# Patient Record
Sex: FEMALE | Race: WHITE | URBAN - METROPOLITAN AREA
[De-identification: names, ages, dates, MRNs, and addresses within clinical notes are randomized per-mention and may not be internally consistent; named-entity substitution may affect disease eponyms.]

---

## 2024-09-06 ENCOUNTER — OFFICE VISIT (OUTPATIENT)
Age: 59
End: 2024-09-06
Payer: COMMERCIAL

## 2024-09-06 VITALS
SYSTOLIC BLOOD PRESSURE: 114 MMHG | RESPIRATION RATE: 18 BRPM | TEMPERATURE: 97.6 F | OXYGEN SATURATION: 99 % | HEART RATE: 89 BPM | DIASTOLIC BLOOD PRESSURE: 80 MMHG

## 2024-09-06 DIAGNOSIS — K02.9 DENTAL CARIES: Primary | ICD-10-CM

## 2024-09-06 LAB — S PYO AG THROAT QL: NEGATIVE

## 2024-09-06 PROCEDURE — 99203 OFFICE O/P NEW LOW 30 MIN: CPT | Performed by: PHYSICIAN ASSISTANT

## 2024-09-06 RX ORDER — DIPHENHYDRAMINE HYDROCHLORIDE AND LIDOCAINE HYDROCHLORIDE AND ALUMINUM HYDROXIDE AND MAGNESIUM HYDRO
10 KIT EVERY 4 HOURS PRN
Qty: 119 ML | Refills: 0 | Status: SHIPPED | OUTPATIENT
Start: 2024-09-06

## 2024-09-06 RX ORDER — TEMAZEPAM 30 MG
30 CAPSULE ORAL
COMMUNITY

## 2024-09-06 NOTE — PROGRESS NOTES
[facility]        NAME: Geraldine Black is a 58 y.o. female  : 1965    MRN: [id number]  DATE: 2024  TIME: 3:19 PM    Assessment and Plan   Dental caries [K02.9]  1. Dental caries  POCT rapid ANTIGEN strepA    amoxicillin-clavulanate (AUGMENTIN) 875-125 mg per tablet    diphenhydramine, lidocaine, Al/Mg hydroxide, simethicone (Magic Mouthwash) SUSP          Follow-up with dentist when she returns home her pain was made much worse with ice water in the mouth confirming dental nerve root source of pain    The patient and/or parent/legal guardian verbalized understanding of exam findings and   Treatment plan. We engaged in the shared decision-making process and treatment options were   discussed at length with the patient.  All questions, concerns and complaints were answered and   addressed to the patient's' and/or parent/legal guardians's satisfaction.    Patient Instructions   There are no Patient Instructions on file for this visit.    Follow up with PCP in 3-5 days.  Proceed to  ER if symptoms worsen.    If tests are performed, our office will contact you with results only if   changes need to made to the care plan discussed with you at the visit.   [facility]     Chief Complaint     Chief Complaint   Patient presents with   • Sore Throat     Pt c/o left lower jaw pain that started 5 days ago not sure if its toothache or nerve pain and sore throat just started getting worse 2 times woke up in agony saw dentist recently and got xrays about 3 months ago and everything looked good has been taking ibuprofen          History of Present Illness       HPI  Patient is complaining of pain lower jaw that started 5 days ago.. Pain is along left lower jaw. Hasn't been to dentist in 3-4 months. Mild left ear ache. No fever or chills. Pain is intermittent. No trouble swallowing. She is eating ok. Cold made it worse last night. Woke up in pain in middle of  night last night. Has taken ibuprofen.     Review of Systems   Review of Systems  All other related systems reviewed and are negative except as noted in HPI    Current Medications       Current Outpatient Medications:   •  amoxicillin-clavulanate (AUGMENTIN) 875-125 mg per tablet, Take 1 tablet by mouth every 12 (twelve) hours for 7 days, Disp: 14 tablet, Rfl: 0  •  diphenhydramine, lidocaine, Al/Mg hydroxide, simethicone (Magic Mouthwash) SUSP, Swish and spit 10 mL every 4 (four) hours as needed for mouth pain or discomfort, Disp: 119 mL, Rfl: 0  •  temazepam (RESTORIL) 30 mg capsule, Take 30 mg by mouth daily at bedtime, Disp: , Rfl:     Current Allergies     Allergies as of 09/06/2024   • (No Known Allergies)            The following portions of the patient's history were reviewed and updated as appropriate: allergies, current medications, past family history, past medical history, past social history, past surgical history and problem list.     History reviewed. No pertinent past medical history.    History reviewed. No pertinent surgical history.    History reviewed. No pertinent family history.      Medications have been verified.        Objective   /80   Pulse 89   Temp 97.6 °F (36.4 °C) (Tympanic)   Resp 18   SpO2 99%   No LMP recorded.       Physical Exam     Physical Exam  Constitutional:       General: She is not in acute distress.     Appearance: She is well-developed.   HENT:      Head: Normocephalic and atraumatic.      Mouth/Throat:      Comments: Mild periodontal disease and gingivitis seen along the most posterior molars primarily upper left and lower left dentition is intact overall there are several fillings present  Eyes:      General: No scleral icterus.     Conjunctiva/sclera: Conjunctivae normal.   Neck:      Trachea: No tracheal deviation.   Pulmonary:      Effort: Pulmonary effort is normal. No respiratory distress.      Breath sounds: No stridor.   Musculoskeletal:      Cervical  "back: Normal range of motion.   Skin:     General: Skin is warm and dry.      Findings: No erythema.   Neurological:      Mental Status: She is alert and oriented to person, place, and time.   Psychiatric:         Behavior: Behavior normal.         Ortho Exam        Procedures  No Procedures performed today        Note: Portions of this record may have been created with voice recognition software. Occasional wrong word or \"sound a like\" substitutions may have occurred due to the inherent limitations of voice recognition software. Please read the chart carefully and recognize, using context, where substitutions have occurred.*      "